# Patient Record
(demographics unavailable — no encounter records)

---

## 2025-02-07 NOTE — HISTORY OF PRESENT ILLNESS
[de-identified] : left knee injury a few weeks ago no head trauma or loc seen at [facility] no prior issues

## 2025-02-07 NOTE — PHYSICAL EXAM
[de-identified] : Knee ranges 5-115 degrees with pain and crepitus stable to varus, valgus, anterior and posterior stress neurovascularly intact distally no pain with hip range of motion negative straight leg raise no effusion, synovitis, or edema or erythema skin intact [de-identified] : mild-mod oa

## 2025-02-07 NOTE — DISCUSSION/SUMMARY
[de-identified] : left knee contusion and oa exacerbation for now will RICE and physical therapy script/referral provided
